# Patient Record
Sex: MALE | Race: WHITE | NOT HISPANIC OR LATINO | Employment: UNEMPLOYED | URBAN - METROPOLITAN AREA
[De-identification: names, ages, dates, MRNs, and addresses within clinical notes are randomized per-mention and may not be internally consistent; named-entity substitution may affect disease eponyms.]

---

## 2022-01-04 ENCOUNTER — MEDICAL CORRESPONDENCE (OUTPATIENT)
Dept: HEALTH INFORMATION MANAGEMENT | Facility: CLINIC | Age: 57
End: 2022-01-04
Payer: COMMERCIAL

## 2022-08-01 ENCOUNTER — MEDICAL CORRESPONDENCE (OUTPATIENT)
Dept: HEALTH INFORMATION MANAGEMENT | Facility: CLINIC | Age: 57
End: 2022-08-01

## 2022-08-04 ENCOUNTER — OFFICE VISIT (OUTPATIENT)
Dept: NEUROLOGY | Facility: CLINIC | Age: 57
End: 2022-08-04
Payer: MEDICARE

## 2022-08-04 VITALS
HEIGHT: 70 IN | BODY MASS INDEX: 25.2 KG/M2 | OXYGEN SATURATION: 95 % | WEIGHT: 176 LBS | SYSTOLIC BLOOD PRESSURE: 136 MMHG | DIASTOLIC BLOOD PRESSURE: 88 MMHG | HEART RATE: 80 BPM

## 2022-08-04 DIAGNOSIS — R20.2 LEFT LEG PARESTHESIAS: Primary | ICD-10-CM

## 2022-08-04 PROCEDURE — 99205 OFFICE O/P NEW HI 60 MIN: CPT | Performed by: PSYCHIATRY & NEUROLOGY

## 2022-08-04 RX ORDER — CHLORAL HYDRATE 500 MG
1000 CAPSULE ORAL DAILY
COMMUNITY

## 2022-08-04 RX ORDER — NIACIN 250 MG
250 TABLET ORAL DAILY
COMMUNITY

## 2022-08-04 NOTE — PATIENT INSTRUCTIONS
AFTER VISIT SUMMARY (AVS):    At today's visit we thoroughly discussed various diagnostic possibilities for your symptoms, necessary evaluation, and the plan, which includes:  Orders Placed This Encounter   Procedures    MR Thoracic Spine w/o Contrast    MR Lumbosacral Plexus wwo Contrast    EMG     No new medications.     Additional recommendations after the work-up.    Next follow-up appointment is in the next 6-8 months or earlier if needed.    Please do not hesitate to call me with any questions or concerns.    Thanks.

## 2022-08-04 NOTE — PROGRESS NOTES
"Ole Luz is a 56 year old male who presents for:  Chief Complaint   Patient presents with     Consult     Pins and Needles in feet said he needs a second opinion for why his feet are tingling  only on left side now         Initial Vitals:  BP (!) 140/90 (BP Location: Left arm, Patient Position: Sitting, Cuff Size: Adult Large)   Pulse 90   Ht 1.778 m (5' 10\")   Wt 79.8 kg (176 lb)   SpO2 98%   BMI 25.25 kg/m   Estimated body mass index is 25.25 kg/m  as calculated from the following:    Height as of this encounter: 1.778 m (5' 10\").    Weight as of this encounter: 79.8 kg (176 lb).. Body surface area is 1.99 meters squared. BP completed using cuff size: large    Visit Facilitator Comments: (2nd set of vitals taken on left arm while seated with adult large cuff) BP- 136/88, Pulse- 80, O2- 95%    Patient wears a hearing aid but also rely's on lip reading       Alivia Burk  "

## 2022-08-04 NOTE — LETTER
8/4/2022         RE: Ole Luz  [address]  [place] [address]        Dear Colleague,    Thank you for referring your patient, Ole Luz, to the [facility]. Please see a copy of my visit note below.    INITIAL NEUROLOGY CONSULTATION    DATE OF VISIT: 8/4/2022  CLINIC LOCATION: [facility]  MRN: [id number]  PATIENT NAME: Ole Luz  YOB: 1965    REASON FOR VISIT:   Chief Complaint   Patient presents with     Consult     Pins and Needles in feet said he needs a second opinion for why his feet are tingling  only on left side now      HISTORY OF PRESENT ILLNESS:                                                    Mr. Ole Luz is 56 year old right handed male patient with past medical history of bilateral sensorineural hearing loss, who was seen today for left foot pain.  Accompanied by his father.  Able to communicate by reading lips.    Per patient's report, symptoms began in December 2015.  He describes intermittent pins-and-needles/tingling and burning sensation in the left foot.  It affects the plantar surface of the left foot occasionally extending over the lateral posterior surface of the leg below the knee.  Turning his body side to side, lifting left leg, and certain sitting positions might trigger his symptoms.  Walking, sitting in the car or reclining chair make symptoms worse.  No additional focal neurological symptoms, aggravating or alleviating factors.  Previous evaluation at [facility] was unrevealing.  He became concerned regarding pinched nerve despite reassuring lumbar spine MRI and EMG and asked his primary care provider for a new neurology referral for second opinion.    According to Care Everywhere, EMG of bilateral lower extremities, performed for feet paresthesia on 03/04/2016, was negative for tibial neuropathy or large fiber peripheral polyneuropathy.  Ultrasound of both tarsal  tunnels at that time was unremarkable.  Brain MRI with and without contrast from 09/29/2016 demonstrated diffuse parenchymal volume loss and scattered nonspecific white matter hyperintensities, stable compared to 2012 study.  Neck MRA from the same day was unrevealing.  Cervical spine MRI from the next day demonstrated multilevel degenerative changes, no but no evidence of significant spinal canal or neuroforaminal stenosis.    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  PAST MEDICAL/SURGICAL HISTORY:                                                    I personally reviewed patient's past medical and surgical history with the patient at today's visit.  MEDICATIONS:                                                    I personally reviewed patient's medications and allergies with the patient at today's visit.  ALLERGIES:                                                    Not on File  EXAM:                                                    VITAL SIGNS:   BP (!) 140/90 (BP Location: Left arm, Patient Position: Sitting, Cuff Size: Adult Large)   Pulse 90   SpO2 98%   Mini-Cog Assessment:  Mini Cog Assessment  Clock Draw Score: 2 Normal  3 Item Recall: 2 objects recalled  Mini Cog Total Score: 4  Administered by: : Alivia THOMAS    General: pt is in NAD, cooperative.  Skin: normal turgor, moist mucous membranes, no lesions/rashes noticed.  HEENT: ATNC, EOMI, PERRL, white sclera, normal conjunctiva, no nystagmus or ptosis. No carotid bruits bilaterally.  Respiratory: lung sounds clear to auscultation bilaterally, no crackles, wheezes, rhonchi. Symmetric lung excursion, no accessory respiratory muscle use.  Cardiovascular: normal S1/S2, no murmurs/rubs/gallops.   Abdomen: Not distended.  : deferred.    Neurological:  Mental: alert, follows commands, Mini Cog Total  Score: 4/5 with 2/3 on memory recall, mild dysarthria, communicate by reading lips. Fund of knowledge is appropriate for age.  Cranial Nerves:  CN II: visual acuity - able to accurately count fingers with each eye. Visual fields intact, fundi: discs sharp, no papilledema and normal vessels bilaterally.  CN III, IV, VI: EOM intact, pupils equal and reactive  CN V: facial sensation nl  CN VII: face symmetric, no facial droop  CN VIII: hearing - deaf b/l  CN IX: palate elevation symmetric, uvula at midline  CN XI SCM normal, shoulder shrug nl  CN XII: tongue midline  Motor: Strength: 5/5 in all major groups of all extremities. Normal tone. No abnormal movements. No pronator drift b/l.  Reflexes: Triceps, biceps, brachioradialis, patellar, and achilles reflexes normal and symmetric. No clonus noted. Toes are down-going b/l.   Sensory: light touch, pinprick, and vibration intact. Romberg: negative.  Coordination: FNF and heel-shin tests intact b/l.   Gait:  Normal, able to tandem walk with mild difficulty.  DATA:   LABS/EEG/IMAGING/OTHER STUDIES: I reviewed pertinent medical records, as detailed in the history of present illness.  ASSESSMENT AND PLAN:      ASSESSMENT: Ole Luz is a 56 year old male patient with listed above past medical history, who presents with persistent intermittent left foot/distal lower extremity paresthesia since 2015 with negative to date work-up.  He would like a second opinion.    We had a detailed discussion with the patient and his father regarding his presenting complaints.  The neurological exam today is non-focal.  The clinical presentation might be consistent with involvement of sciatic, tibial, or plantar nerves on the left side, but the evaluation to date was unrevealing.  It is possible that he developed EMG changes following his initial testing in 2016.  For that reason I feel that EMG should be repeated.  It would be also helpful to check for lumbosacral plexopathy.  In  "addition, his symptoms might be due to thoracic spinal cord involvement.  I do not see that this area of the spine was previously imaged.  I would like to add it to his work-up along with lumbosacral plexus MRI.  Meanwhile, the patient was advised to continue physical therapy.    Ole to follow up with Primary Care provider regarding elevated blood pressure.     DIAGNOSES:    ICD-10-CM    1. Left leg paresthesias  R20.2 MR Thoracic Spine w/o Contrast     MR Lumbosacral Plexus wwo Contrast     EMG     PLAN: At today's visit we thoroughly discussed various diagnostic possibilities for patient's symptoms, necessary evaluation, and the plan, which includes:  Orders Placed This Encounter   Procedures     MR Thoracic Spine w/o Contrast     MR Lumbosacral Plexus wwo Contrast     EMG     No new medications.     Additional recommendations after the work-up.    Next follow-up appointment is in the next 6-8 months or earlier if needed.    Total Time: 71 minutes spent on the date of the encounter doing chart review, history and exam, documentation and further activities per the note.    Jona Murray MD  [facility]  (Chart documentation was completed in part with Dragon voice-recognition software. Even though reviewed, some grammatical, spelling, and word errors may remain.)            Ole Luz is a 56 year old male who presents for:  Chief Complaint   Patient presents with     Consult     Pins and Needles in feet said he needs a second opinion for why his feet are tingling  only on left side now         Initial Vitals:  BP (!) 140/90 (BP Location: Left arm, Patient Position: Sitting, Cuff Size: Adult Large)   Pulse 90   Ht 1.778 m (5' 10\")   Wt 79.8 kg (176 lb)   SpO2 98%   BMI 25.25 kg/m   Estimated body mass index is 25.25 kg/m  as calculated from the following:    Height as of this encounter: 1.778 m (5' 10\").    Weight as of this encounter: 79.8 kg (176 lb).. Body surface area " is 1.99 meters squared. BP completed using cuff size: large    Visit Facilitator Comments: (2nd set of vitals taken on left arm while seated with adult large cuff) BP- 136/88, Pulse- 80, O2- 95%    Patient wears a hearing aid but also rely's on lip reading       Alivia Burk      Again, thank you for allowing me to participate in the care of your patient.        Sincerely,        Jona Murray MD

## 2022-08-04 NOTE — PROGRESS NOTES
INITIAL NEUROLOGY CONSULTATION    DATE OF VISIT: 8/4/2022  CLINIC LOCATION: [facility]  MRN: [id number]  PATIENT NAME: Ole Luz  YOB: 1965    REASON FOR VISIT:   Chief Complaint   Patient presents with     Consult     Pins and Needles in feet said he needs a second opinion for why his feet are tingling  only on left side now      HISTORY OF PRESENT ILLNESS:                                                    Mr. Ole Luz is 56 year old right handed male patient with past medical history of bilateral sensorineural hearing loss, who was seen today for left foot pain.  Accompanied by his father.  Able to communicate by reading lips.    Per patient's report, symptoms began in December 2015.  He describes intermittent pins-and-needles/tingling and burning sensation in the left foot.  It affects the plantar surface of the left foot occasionally extending over the lateral posterior surface of the leg below the knee.  Turning his body side to side, lifting left leg, and certain sitting positions might trigger his symptoms.  Walking, sitting in the car or reclining chair make symptoms worse.  No additional focal neurological symptoms, aggravating or alleviating factors.  Previous evaluation at [facility] was unrevealing.  He became concerned regarding pinched nerve despite reassuring lumbar spine MRI and EMG and asked his primary care provider for a new neurology referral for second opinion.    According to Care Everywhere, EMG of bilateral lower extremities, performed for feet paresthesia on 03/04/2016, was negative for tibial neuropathy or large fiber peripheral polyneuropathy.  Ultrasound of both tarsal tunnels at that time was unremarkable.  Brain MRI with and without contrast from 09/29/2016 demonstrated diffuse parenchymal volume loss and scattered nonspecific white matter hyperintensities, stable compared to 2012 study.  Neck MRA from the same day was  unrevealing.  Cervical spine MRI from the next day demonstrated multilevel degenerative changes, no but no evidence of significant spinal canal or neuroforaminal stenosis.    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  PAST MEDICAL/SURGICAL HISTORY:                                                    I personally reviewed patient's past medical and surgical history with the patient at today's visit.  MEDICATIONS:                                                    I personally reviewed patient's medications and allergies with the patient at today's visit.  ALLERGIES:                                                    Not on File  EXAM:                                                    VITAL SIGNS:   BP (!) 140/90 (BP Location: Left arm, Patient Position: Sitting, Cuff Size: Adult Large)   Pulse 90   SpO2 98%   Mini-Cog Assessment:  Mini Cog Assessment  Clock Draw Score: 2 Normal  3 Item Recall: 2 objects recalled  Mini Cog Total Score: 4  Administered by: : Alivia THOMAS    General: pt is in NAD, cooperative.  Skin: normal turgor, moist mucous membranes, no lesions/rashes noticed.  HEENT: ATNC, EOMI, PERRL, white sclera, normal conjunctiva, no nystagmus or ptosis. No carotid bruits bilaterally.  Respiratory: lung sounds clear to auscultation bilaterally, no crackles, wheezes, rhonchi. Symmetric lung excursion, no accessory respiratory muscle use.  Cardiovascular: normal S1/S2, no murmurs/rubs/gallops.   Abdomen: Not distended.  : deferred.    Neurological:  Mental: alert, follows commands, Mini Cog Total Score: 4/5 with 2/3 on memory recall, mild dysarthria, communicate by reading lips. Fund of knowledge is appropriate for age.  Cranial Nerves:  CN II: visual acuity - able to accurately count fingers with each eye. Visual fields intact, fundi: discs sharp, no  papilledema and normal vessels bilaterally.  CN III, IV, VI: EOM intact, pupils equal and reactive  CN V: facial sensation nl  CN VII: face symmetric, no facial droop  CN VIII: hearing - deaf b/l  CN IX: palate elevation symmetric, uvula at midline  CN XI SCM normal, shoulder shrug nl  CN XII: tongue midline  Motor: Strength: 5/5 in all major groups of all extremities. Normal tone. No abnormal movements. No pronator drift b/l.  Reflexes: Triceps, biceps, brachioradialis, patellar, and achilles reflexes normal and symmetric. No clonus noted. Toes are down-going b/l.   Sensory: light touch, pinprick, and vibration intact. Romberg: negative.  Coordination: FNF and heel-shin tests intact b/l.   Gait:  Normal, able to tandem walk with mild difficulty.  DATA:   LABS/EEG/IMAGING/OTHER STUDIES: I reviewed pertinent medical records, as detailed in the history of present illness.  ASSESSMENT AND PLAN:      ASSESSMENT: Ole Luz is a 56 year old male patient with listed above past medical history, who presents with persistent intermittent left foot/distal lower extremity paresthesia since 2015 with negative to date work-up.  He would like a second opinion.    We had a detailed discussion with the patient and his father regarding his presenting complaints.  The neurological exam today is non-focal.  The clinical presentation might be consistent with involvement of sciatic, tibial, or plantar nerves on the left side, but the evaluation to date was unrevealing.  It is possible that he developed EMG changes following his initial testing in 2016.  For that reason I feel that EMG should be repeated.  It would be also helpful to check for lumbosacral plexopathy.  In addition, his symptoms might be due to thoracic spinal cord involvement.  I do not see that this area of the spine was previously imaged.  I would like to add it to his work-up along with lumbosacral plexus MRI.  Meanwhile, the patient was advised to continue  physical therapy.    Ole to follow up with Primary Care provider regarding elevated blood pressure.     DIAGNOSES:    ICD-10-CM    1. Left leg paresthesias  R20.2 MR Thoracic Spine w/o Contrast     MR Lumbosacral Plexus wwo Contrast     EMG     PLAN: At today's visit we thoroughly discussed various diagnostic possibilities for patient's symptoms, necessary evaluation, and the plan, which includes:  Orders Placed This Encounter   Procedures     MR Thoracic Spine w/o Contrast     MR Lumbosacral Plexus wwo Contrast     EMG     No new medications.     Additional recommendations after the work-up.    Next follow-up appointment is in the next 6-8 months or earlier if needed.    Total Time: 71 minutes spent on the date of the encounter doing chart review, history and exam, documentation and further activities per the note.    Jona Murray MD  [facility] Neurology  (Chart documentation was completed in part with Dragon voice-recognition software. Even though reviewed, some grammatical, spelling, and word errors may remain.)

## 2022-08-11 ENCOUNTER — TRANSCRIBE ORDERS (OUTPATIENT)
Dept: OTHER | Age: 57
End: 2022-08-11

## 2022-08-11 DIAGNOSIS — M79.672 LEFT FOOT PAIN: ICD-10-CM

## 2022-08-11 DIAGNOSIS — M47.817 LUMBOSACRAL SPONDYLOSIS WITHOUT MYELOPATHY: ICD-10-CM

## 2022-08-11 DIAGNOSIS — M43.06 PARS DEFECT OF LUMBAR SPINE: ICD-10-CM

## 2022-08-11 DIAGNOSIS — M72.2 PLANTAR FASCIITIS, BILATERAL: ICD-10-CM

## 2022-08-11 DIAGNOSIS — R20.2 PARESTHESIA OF BOTH FEET: Primary | ICD-10-CM

## 2022-08-17 ENCOUNTER — ANCILLARY PROCEDURE (OUTPATIENT)
Dept: MRI IMAGING | Facility: CLINIC | Age: 57
End: 2022-08-17
Attending: PSYCHIATRY & NEUROLOGY
Payer: COMMERCIAL

## 2022-08-17 DIAGNOSIS — R20.2 LEFT LEG PARESTHESIAS: ICD-10-CM

## 2022-08-17 PROCEDURE — 72146 MRI CHEST SPINE W/O DYE: CPT

## 2022-08-17 PROCEDURE — 72158 MRI LUMBAR SPINE W/O & W/DYE: CPT

## 2022-08-17 PROCEDURE — A9585 GADOBUTROL INJECTION: HCPCS | Performed by: PSYCHIATRY & NEUROLOGY

## 2022-08-17 PROCEDURE — 255N000002 HC RX 255 OP 636: Performed by: PSYCHIATRY & NEUROLOGY

## 2022-08-17 RX ORDER — GADOBUTROL 604.72 MG/ML
9 INJECTION INTRAVENOUS ONCE
Status: COMPLETED | OUTPATIENT
Start: 2022-08-17 | End: 2022-08-17

## 2022-08-17 RX ADMIN — GADOBUTROL 9 ML: 604.72 INJECTION INTRAVENOUS at 12:21

## 2022-10-07 ENCOUNTER — OFFICE VISIT (OUTPATIENT)
Dept: NEUROLOGY | Facility: CLINIC | Age: 57
End: 2022-10-07
Attending: PSYCHIATRY & NEUROLOGY
Payer: COMMERCIAL

## 2022-10-07 DIAGNOSIS — R20.2 LEFT LEG PARESTHESIAS: Primary | ICD-10-CM

## 2022-10-07 PROCEDURE — 95886 MUSC TEST DONE W/N TEST COMP: CPT | Performed by: PHYSICAL MEDICINE & REHABILITATION

## 2022-10-07 PROCEDURE — 95909 NRV CNDJ TST 5-6 STUDIES: CPT | Performed by: PHYSICAL MEDICINE & REHABILITATION

## 2022-10-07 NOTE — PROGRESS NOTES
[facility]  Electrodiagnostic Laboratory                 Department of Neurology                                                                                                         Test Date:  10/7/2022    Patient: Ole Luz : 1965 Physician: Amrita Shaw MD   Sex: Male AGE: 56 year Ref Phys: Jona Murray MD   ID#: [id number]   Technician: MIGUE Baez     Clinical Information:  Ole Luz is a 55 yo male who presents with chronic paresthesia of his left foot. Extensive work-up in 2016 was inconclusive. Query left tarsal tunnel syndrome, tibial neuropathy, lumbosacral plexopathy, and S1 radiculopathy.     Techniques:  Motor and sensory conduction studies were done with surface recording electrodes. EMG was done with a concentric needle electrode.     Results:  Evaluation of the left Medial Plantar (Mixed) sensory and the right Medial Plantar (Mixed) sensory nerves showed reduced amplitude (L1, R4  V).  All remaining nerves (as indicated in the following tables) were within normal limits.      All examined muscles (as indicated in the following table) showed no evidence of electrical instability.        Interpretation:  Essentially normal study.    There is no electrodiagnostic evidence to suggest the presence of a superimposed left-sided lumbosacral plexopathy  or radiculopathy to account for the patient's symptoms.     Similarly, the symmetric peak latencies of the medial plantar nerve action potentials and the normal tibial motor latencies argue against a significant entrapment of the distal posterior tibial nerve in the tarsal tunnel. Clinical correlation is recommended.      ___________________________  Amrita Shaw MD        Nerve Conduction Studies  Motor Sites      Latency Amplitude Neg. Amp Diff Segment Distance Velocity Neg. Dur Neg Area Diff Temperature Comment   Site (ms) Norm (mV) Norm %  cm m/s Norm ms %  C     Left Fibular (EDB) Motor   Ankle 5.7  < 6.0 7.3  > 2.5  Ankle-EDB 8   5.7  31    Bel Fib Head 12.9 - 6.7 - -8.2 Bel Fib Head-Ankle 29.5 41  > 38 5.9 -5.6 31    Pop Fossa 15.6 - 5.8 - -13.4 Pop Fossa-Bel Fib Head 11.5 43  > 38 6.1 -8.6 31    Left Tibial (AHB) Motor   Ankle 5.5  < 6.5 7.1  > 4.4  Ankle-AHB 8   7.3  31    Knee 13.9 - 7.0 - -1.41 Knee-Ankle 39 46  > 38 8.5 7.8 31.1    Right Tibial (AHB) Motor   Ankle 5.1  < 6.5 7.8  > 4.4  Ankle-AHB 8   5.6  31      Sensory Sites      Onset Lat Peak Lat Amp (O-P) Amp (P-P) Segment Distance Velocity Temperature Comment   Site ms ms  V Norm  V  cm m/s Norm  C    Left Medial Plantar (Mixed) Sensory   Med Sole-Med Mall 3.1 3.6 1 - 1 Med Sole-Med Mall 15 48 - 31.4    Right Medial Plantar (Mixed) Sensory   Med Sole-Med Mall 3.1 3.8 1 - 4 Med Sole-Med Mall 14 45 - 31    Left Sural Sensory   Calf-Lat Mall 3.6 4.5 10  > 5 16 Calf-Lat Mall 14 39  > 38 31.4        Electromyography     Side Muscle Ins Act Fibs/PSW Fasc HF Amp Dur Poly Recrt Int Pat   Left Tib Anterior Nml None Nml 0 Nml Nml 0 Nml Nml   Left Gastroc Nml None Nml 0 Nml Nml 0 Nml Nml   Left Vastus Lat Nml None Nml 0 Nml Nml 0 Nml Nml   Left Biceps Fem SH Nml None Nml 0 Nml Nml 0 Nml Nml   Left Gluteus Med Nml None Nml 0 Nml Nml 0 Nml Nml         NCS Waveforms:    Motor           Sensory

## 2022-10-07 NOTE — LETTER
10/7/2022       RE: Ole Luz  [address]  [place] [address]     Dear Colleague,    Thank you for referring your patient, Ole Luz, to the [facility] EMG CLINIC [place] [facility]. Please see a copy of my visit note below.                          [facility]  Electrodiagnostic Laboratory                 Department of Neurology                                                                                                         Test Date:  10/7/2022    Patient: Ole Luz : 1965 Physician: Amrita Shaw MD   Sex: Male AGE: 56 year Ref Phys: Jona Murray MD   ID#: [id number]   Technician: MIGUE Baez     Clinical Information:  Ole Luz is a 57 yo male who presents with chronic paresthesia of his left foot. Extensive work-up in  was inconclusive. Query left tarsal tunnel syndrome, tibial neuropathy, lumbosacral plexopathy, and S1 radiculopathy.     Techniques:  Motor and sensory conduction studies were done with surface recording electrodes. EMG was done with a concentric needle electrode.     Results:  Evaluation of the left Medial Plantar (Mixed) sensory and the right Medial Plantar (Mixed) sensory nerves showed reduced amplitude (L1, R4  V).  All remaining nerves (as indicated in the following tables) were within normal limits.      All examined muscles (as indicated in the following table) showed no evidence of electrical instability.        Interpretation:  Essentially normal study.    There is no electrodiagnostic evidence to suggest the presence of a superimposed left-sided lumbosacral plexopathy  or radiculopathy to account for the patient's symptoms.     Similarly, the symmetric peak latencies of the medial plantar nerve action potentials and the normal tibial motor latencies argue against a significant entrapment of the distal posterior tibial nerve  in the tarsal tunnel. Clinical correlation is recommended.      ___________________________  Amrita Shaw MD        Nerve Conduction Studies  Motor Sites      Latency Amplitude Neg. Amp Diff Segment Distance Velocity Neg. Dur Neg Area Diff Temperature Comment   Site (ms) Norm (mV) Norm %  cm m/s Norm ms %  C    Left Fibular (EDB) Motor   Ankle 5.7  < 6.0 7.3  > 2.5  Ankle-EDB 8   5.7  31    Bel Fib Head 12.9 - 6.7 - -8.2 Bel Fib Head-Ankle 29.5 41  > 38 5.9 -5.6 31    Pop Fossa 15.6 - 5.8 - -13.4 Pop Fossa-Bel Fib Head 11.5 43  > 38 6.1 -8.6 31    Left Tibial (AHB) Motor   Ankle 5.5  < 6.5 7.1  > 4.4  Ankle-AHB 8   7.3  31    Knee 13.9 - 7.0 - -1.41 Knee-Ankle 39 46  > 38 8.5 7.8 31.1    Right Tibial (AHB) Motor   Ankle 5.1  < 6.5 7.8  > 4.4  Ankle-AHB 8   5.6  31      Sensory Sites      Onset Lat Peak Lat Amp (O-P) Amp (P-P) Segment Distance Velocity Temperature Comment   Site ms ms  V Norm  V  cm m/s Norm  C    Left Medial Plantar (Mixed) Sensory   Med Sole-Med Mall 3.1 3.6 1 - 1 Med Sole-Med Mall 15 48 - 31.4    Right Medial Plantar (Mixed) Sensory   Med Sole-Med Mall 3.1 3.8 1 - 4 Med Sole-Med Mall 14 45 - 31    Left Sural Sensory   Calf-Lat Mall 3.6 4.5 10  > 5 16 Calf-Lat Mall 14 39  > 38 31.4        Electromyography     Side Muscle Ins Act Fibs/PSW Fasc HF Amp Dur Poly Recrt Int Pat   Left Tib Anterior Nml None Nml 0 Nml Nml 0 Nml Nml   Left Gastroc Nml None Nml 0 Nml Nml 0 Nml Nml   Left Vastus Lat Nml None Nml 0 Nml Nml 0 Nml Nml   Left Biceps Fem SH Nml None Nml 0 Nml Nml 0 Nml Nml   Left Gluteus Med Nml None Nml 0 Nml Nml 0 Nml Nml         NCS Waveforms:    Motor           Sensory                       Again, thank you for allowing me to participate in the care of your patient.      Sincerely,    Amrita Shaw MD

## 2022-10-17 ENCOUNTER — OFFICE VISIT (OUTPATIENT)
Dept: NEUROLOGY | Facility: CLINIC | Age: 57
End: 2022-10-17
Payer: COMMERCIAL

## 2022-10-17 VITALS — HEART RATE: 69 BPM | DIASTOLIC BLOOD PRESSURE: 84 MMHG | OXYGEN SATURATION: 99 % | SYSTOLIC BLOOD PRESSURE: 126 MMHG

## 2022-10-17 DIAGNOSIS — R20.2 LEFT LEG PARESTHESIAS: Primary | ICD-10-CM

## 2022-10-17 PROCEDURE — 99215 OFFICE O/P EST HI 40 MIN: CPT | Performed by: PSYCHIATRY & NEUROLOGY

## 2022-10-17 RX ORDER — TRIAMCINOLONE ACETONIDE 55 UG/1
SPRAY, METERED NASAL
COMMUNITY
Start: 2022-09-12

## 2022-10-17 RX ORDER — CHOLECALCIFEROL (VITAMIN D3) 25 MCG
1 TABLET ORAL DAILY
COMMUNITY
Start: 2022-08-18

## 2022-10-17 RX ORDER — LORATADINE 10 MG/1
TABLET ORAL
COMMUNITY
Start: 2022-10-10

## 2022-10-17 NOTE — PATIENT INSTRUCTIONS
AFTER VISIT SUMMARY (AVS):    At today's visit we thoroughly discussed current symptoms, evaluation results, and the plan.    I recommend he continue physical therapy.  I do not think that additional testing would be revealing at this point.    Next follow-up appointment is on as needed basis.    Please do not hesitate to call me with any questions or concerns.    Thanks.

## 2022-10-17 NOTE — PROGRESS NOTES
ESTABLISHED PATIENT NEUROLOGY NOTE    DATE OF VISIT: 10/17/2022  CLINIC LOCATION: [facility]  MRN: [id number]  PATIENT NAME: Ole Luz  YOB: 1965    REASON FOR VISIT:   Chief Complaint   Patient presents with     Results     SUBJECTIVE:                                                      HISTORY OF PRESENT ILLNESS: Patient is here to follow up regarding left leg paresthesia. Please refer to my initial note from 8/4/2022 for further information.  Accompanied by his father.  Able to communicate by reading lips.    Since the last visit, the patient reports no change in his symptoms.  He denies interval development of new focal neurological symptoms.    MRI of lumbar sacral plexus was unremarkable along with thoracic spine MRI (both performed on 8/17/2022), which did not demonstrate any significant abnormality of thoracic spinal cord, spinal canal narrowing or neuroforaminal stenosis.  All images were personally reviewed and independently interpreted.    EMG from 10/7/2022 was normal.  Tabulated data from EMG report were personally reviewed and independently interpreted.  EXAM:                                                    Physical Exam:   Vitals: /84   Pulse 69   SpO2 99%     General: pt is in NAD, cooperative.  Skin: normal turgor, moist mucous membranes, no lesions/rashes noticed.  HEENT: ATNC, white sclera, normal conjunctiva.  Respiratory: Symmetric lung excursion, no accessory respiratory muscle use.  Abdomen: Non distended.  Neurological: awake, cooperative, follows commands, no exam changes compared to the initial visit.  ASSESSMENT AND PLAN:                                                    Assessment: 56 year old male patient presents for follow-up of left leg paresthesia since 2015 after the completion of recommended work-up that was unrevealing, including EMG, MRI of lumbar sacral plexus and thoracic spine.  I discussed results in details.  The  patient asked whether ultrasound of his nerves would be helpful, but I explained to the patient that in absence of any EMG abnormalities, I do not believe that it would be revealing.  I would not recommend any additional work-up/testing at the present time.  He should continue his physical therapy with as needed follow-up.    Diagnoses:    ICD-10-CM    1. Left leg paresthesias  R20.2         Plan: At today's visit we thoroughly discussed current symptoms, evaluation results, and the plan.    I recommended to continue physical therapy.  I do not think that additional testing would be revealing at this point.    Next follow-up appointment is on as needed basis.    Total Time: 42 minutes spent on the date of the encounter doing chart review, history and exam, documentation and further activities per the note.  Extra time was needed to discuss patient's concerns, review results of the work-up, and answer numerous questions that the patient had.    Jona Murray MD  [facility]  (Chart documentation was completed in part with Dragon voice-recognition software. Even though reviewed, some grammatical, spelling, and word errors may remain.)

## 2022-10-17 NOTE — LETTER
10/17/2022         RE: Ole Luz  [address]  [place] [address]        Dear Colleague,    Thank you for referring your patient, Ole Luz, to the [facility]. Please see a copy of my visit note below.    ESTABLISHED PATIENT NEUROLOGY NOTE    DATE OF VISIT: 10/17/2022  CLINIC LOCATION: [facility]  MRN: [id number]  PATIENT NAME: Ole Luz  YOB: 1965    REASON FOR VISIT:   Chief Complaint   Patient presents with     Results     SUBJECTIVE:                                                      HISTORY OF PRESENT ILLNESS: Patient is here to follow up regarding left leg paresthesia. Please refer to my initial note from 8/4/2022 for further information.  Accompanied by his father.  Able to communicate by reading lips.    Since the last visit, the patient reports no change in his symptoms.  He denies interval development of new focal neurological symptoms.    MRI of lumbar sacral plexus was unremarkable along with thoracic spine MRI (both performed on 8/17/2022), which did not demonstrate any significant abnormality of thoracic spinal cord, spinal canal narrowing or neuroforaminal stenosis.  All images were personally reviewed and independently interpreted.    EMG from 10/7/2022 was normal.  Tabulated data from EMG report were personally reviewed and independently interpreted.  EXAM:                                                    Physical Exam:   Vitals: /84   Pulse 69   SpO2 99%     General: pt is in NAD, cooperative.  Skin: normal turgor, moist mucous membranes, no lesions/rashes noticed.  HEENT: ATNC, white sclera, normal conjunctiva.  Respiratory: Symmetric lung excursion, no accessory respiratory muscle use.  Abdomen: Non distended.  Neurological: awake, cooperative, follows commands, no exam changes compared to the initial visit.  ASSESSMENT AND PLAN:                                                     Assessment: 56 year old male patient presents for follow-up of left leg paresthesia since 2015 after the completion of recommended work-up that was unrevealing, including EMG, MRI of lumbar sacral plexus and thoracic spine.  I discussed results in details.  The patient asked whether ultrasound of his nerves would be helpful, but I explained to the patient that in absence of any EMG abnormalities, I do not believe that it would be revealing.  I would not recommend any additional work-up/testing at the present time.  He should continue his physical therapy with as needed follow-up.    Diagnoses:    ICD-10-CM    1. Left leg paresthesias  R20.2         Plan: At today's visit we thoroughly discussed current symptoms, evaluation results, and the plan.    I recommended to continue physical therapy.  I do not think that additional testing would be revealing at this point.    Next follow-up appointment is on as needed basis.    Total Time: 42 minutes spent on the date of the encounter doing chart review, history and exam, documentation and further activities per the note.  Extra time was needed to discuss patient's concerns, review results of the work-up, and answer numerous questions that the patient had.    Jona Murray MD  [facility]  (Chart documentation was completed in part with Dragon voice-recognition software. Even though reviewed, some grammatical, spelling, and word errors may remain.)        Again, thank you for allowing me to participate in the care of your patient.        Sincerely,        Jona Murray MD